# Patient Record
Sex: FEMALE | Race: WHITE | NOT HISPANIC OR LATINO | Employment: UNEMPLOYED | URBAN - METROPOLITAN AREA
[De-identification: names, ages, dates, MRNs, and addresses within clinical notes are randomized per-mention and may not be internally consistent; named-entity substitution may affect disease eponyms.]

---

## 2018-09-23 ENCOUNTER — HOSPITAL ENCOUNTER (EMERGENCY)
Facility: HOSPITAL | Age: 23
Discharge: HOME | End: 2018-09-23
Attending: FAMILY MEDICINE
Payer: MEDICAID

## 2018-09-23 VITALS
RESPIRATION RATE: 17 BRPM | OXYGEN SATURATION: 99 % | HEART RATE: 69 BPM | DIASTOLIC BLOOD PRESSURE: 71 MMHG | SYSTOLIC BLOOD PRESSURE: 125 MMHG | TEMPERATURE: 98 F | HEIGHT: 68 IN | WEIGHT: 293 LBS | BODY MASS INDEX: 44.41 KG/M2

## 2018-09-23 DIAGNOSIS — J06.9 URI, ACUTE: Primary | ICD-10-CM

## 2018-09-23 DIAGNOSIS — N30.00 ACUTE CYSTITIS WITHOUT HEMATURIA: ICD-10-CM

## 2018-09-23 LAB
B-HCG UR QL: NEGATIVE
BACTERIA #/AREA URNS AUTO: ABNORMAL /HPF
BACTERIA GENITAL QL WET PREP: ABNORMAL
BILIRUB UR QL STRIP: NEGATIVE
CLARITY UR REFRACT.AUTO: ABNORMAL
CLUE CELLS VAG QL WET PREP: ABNORMAL
COLOR UR AUTO: YELLOW
DEPRECATED S PYO AG THROAT QL EIA: NEGATIVE
FILAMENT FUNGI VAG WET PREP-#/AREA: ABNORMAL
FLUAV AG SPEC QL IA: NEGATIVE
FLUBV AG SPEC QL IA: NEGATIVE
GLUCOSE UR QL STRIP: NEGATIVE
HGB UR QL STRIP: NEGATIVE
KETONES UR QL STRIP: NEGATIVE
LEUKOCYTE ESTERASE UR QL STRIP: ABNORMAL
MICROSCOPIC COMMENT: ABNORMAL
NITRITE UR QL STRIP: NEGATIVE
PH UR STRIP: 5 [PH] (ref 5–8)
PROT UR QL STRIP: NEGATIVE
SP GR UR STRIP: 1.01 (ref 1–1.03)
SPECIMEN SOURCE: ABNORMAL
SPECIMEN SOURCE: NORMAL
SQUAMOUS #/AREA URNS AUTO: 3 /HPF
T VAGINALIS GENITAL QL WET PREP: ABNORMAL
URN SPEC COLLECT METH UR: ABNORMAL
UROBILINOGEN UR STRIP-ACNC: NEGATIVE EU/DL
WBC #/AREA URNS AUTO: 2 /HPF (ref 0–5)
WBC #/AREA VAG WET PREP: ABNORMAL
YEAST GENITAL QL WET PREP: ABNORMAL

## 2018-09-23 PROCEDURE — 87210 SMEAR WET MOUNT SALINE/INK: CPT

## 2018-09-23 PROCEDURE — 81000 URINALYSIS NONAUTO W/SCOPE: CPT

## 2018-09-23 PROCEDURE — 87400 INFLUENZA A/B EACH AG IA: CPT

## 2018-09-23 PROCEDURE — 87147 CULTURE TYPE IMMUNOLOGIC: CPT | Mod: 59

## 2018-09-23 PROCEDURE — 99284 EMERGENCY DEPT VISIT MOD MDM: CPT

## 2018-09-23 PROCEDURE — 87491 CHLMYD TRACH DNA AMP PROBE: CPT

## 2018-09-23 PROCEDURE — 87880 STREP A ASSAY W/OPTIC: CPT | Mod: 59

## 2018-09-23 PROCEDURE — 81025 URINE PREGNANCY TEST: CPT

## 2018-09-23 PROCEDURE — 87081 CULTURE SCREEN ONLY: CPT

## 2018-09-23 RX ORDER — NITROFURANTOIN 25; 75 MG/1; MG/1
100 CAPSULE ORAL 2 TIMES DAILY
Qty: 10 CAPSULE | Refills: 0 | Status: SHIPPED | OUTPATIENT
Start: 2018-09-23 | End: 2018-09-28

## 2018-09-23 RX ORDER — FLUCONAZOLE 200 MG/1
200 TABLET ORAL DAILY
Qty: 1 TABLET | Refills: 0 | Status: SHIPPED | OUTPATIENT
Start: 2018-09-23 | End: 2018-09-24

## 2018-09-23 NOTE — DISCHARGE INSTRUCTIONS
Your urinalysis revealed evidence of bladder infection.  You are instructed to follow up with your primary care provider for re-evaluation within 3 days.  You are instructed to return to the emergency department immediately for any new or worsening symptoms

## 2018-09-23 NOTE — ED PROVIDER NOTES
Encounter Date: 9/23/2018       History     Chief Complaint   Patient presents with    Generalized Body Aches     sore throat, body aches and bilateral ear pain x 3 days---also c/o possible yeast infection-c/o irritation to vagina with white discharge     23-year-old female presents to the emergency department for evaluation of 3 day history of bilateral ear pain, sore throat, mild body aches, dysuria and possible yeast infection.  She reports that the symptoms began gradually 3 days ago and have been constant since onset.  She denies any fever, headache, dizziness, chest pain, cough, abdominal pain, nausea, vomiting, diarrhea, flank pain or vaginal bleeding.  She reports that she had a baby 2 months ago and has not had a menstrual period since then.  He reports that it was an uncomplicated pregnancy, uncomplicated delivery and has not had any complications since coming home.  She reports that she is bottle-feeding not breast feeding.  She reports that she does have a history of HSV, but does not feel like she is having break.          Review of patient's allergies indicates:  No Known Allergies  No past medical history on file.  No past surgical history on file.  No family history on file.  Social History     Tobacco Use    Smoking status: Not on file   Substance Use Topics    Alcohol use: Not on file    Drug use: Not on file     Review of Systems   Constitutional: Negative for activity change, appetite change and fever.   HENT: Positive for congestion, rhinorrhea and sore throat. Negative for ear discharge, ear pain, facial swelling, nosebleeds, sinus pressure, sinus pain, trouble swallowing and voice change.    Eyes: Negative for photophobia and visual disturbance.   Respiratory: Negative for cough, chest tightness, shortness of breath and wheezing.    Cardiovascular: Negative for chest pain.   Gastrointestinal: Negative for abdominal pain, blood in stool, constipation, diarrhea, nausea and vomiting.    Genitourinary: Positive for dysuria and vaginal discharge. Negative for decreased urine volume, flank pain, hematuria, pelvic pain and vaginal bleeding.   Musculoskeletal: Negative for back pain, joint swelling, neck pain and neck stiffness.   Neurological: Negative for dizziness, syncope, weakness, light-headedness, numbness and headaches.   Psychiatric/Behavioral: Negative for confusion.       Physical Exam     Initial Vitals [09/23/18 1316]   BP Pulse Resp Temp SpO2   (!) 146/77 82 18 98.4 °F (36.9 °C) 100 %      MAP       --         Physical Exam    Nursing note and vitals reviewed.  Constitutional: She appears well-developed and well-nourished. She is not diaphoretic. No distress.   HENT:   Head: Normocephalic and atraumatic.   Right Ear: External ear normal.   Left Ear: External ear normal.   Nose: Nose normal.   Mouth/Throat: Oropharynx is clear and moist.   Eyes: Conjunctivae and EOM are normal. Pupils are equal, round, and reactive to light.   Neck: Normal range of motion. Neck supple.   Cardiovascular: Normal rate, regular rhythm and normal heart sounds. Exam reveals no gallop and no friction rub.    No murmur heard.  Pulmonary/Chest: Breath sounds normal. No respiratory distress. She has no wheezes. She has no rhonchi. She has no rales. She exhibits no tenderness.   Abdominal: Soft. Bowel sounds are normal. She exhibits no distension. There is no tenderness. There is no rebound and no CVA tenderness.   Genitourinary:   Genitourinary Comments: Mild thick white discharge noted in the vaginal vault.  No CMT or adnexal tenderness noted. No rash, ulcerations or chancre noted to the external genitalia.   Musculoskeletal: Normal range of motion.   Lymphadenopathy:     She has no cervical adenopathy.   Neurological: She is alert and oriented to person, place, and time. No cranial nerve deficit.   Skin: Skin is warm and dry.   Psychiatric: She has a normal mood and affect.         ED Course   Procedures  Labs  Reviewed   URINALYSIS, REFLEX TO URINE CULTURE - Abnormal; Notable for the following components:       Result Value    Appearance, UA Hazy (*)     Leukocytes, UA 1+ (*)     All other components within normal limits    Narrative:     Preferred Collection Type->Urine, Clean Catch   VAGINAL SCREEN - Abnormal; Notable for the following components:    WBC - Vaginal Screen Rare (*)     Bacteria - Vaginal Screen Moderate (*)     All other components within normal limits   URINALYSIS MICROSCOPIC - Abnormal; Notable for the following components:    Bacteria, UA Moderate (*)     All other components within normal limits    Narrative:     Preferred Collection Type->Urine, Clean Catch   THROAT SCREEN, RAPID   C. TRACHOMATIS/N. GONORRHOEAE BY AMP DNA   CULTURE, STREP A,  THROAT   INFLUENZA A AND B ANTIGEN   PREGNANCY TEST, URINE RAPID          Imaging Results    None          Medical Decision Making:   Initial Assessment:   23-year-old female presents for evaluation of nasal congestion, sore throat, generalized body aches, dysuria and vaginal discharge. Physical exam reveals a nontoxic-appearing female in no acute distress. Patient is afebrile vital signs within normal limits.  Neurological exam reveals an alert and oriented patient.  Patient appears well hydrated as her mucous membranes are moist. TMs reveal no erythema.  Posterior pharynx reveals erythema, edema or tonsillar exudate. No uvular edema or deviation noted.  No trismus, stridor or drooling noted.  Lungs clear to auscultation bilaterally. No respiratory distress or accessory muscle use noted. Abdominal exam reveals a soft abdomen, nontender to palpation. No CVA tenderness noted.  exam reveals Mild thick white discharge noted in the vaginal vault.  No CMT or adnexal tenderness noted. No rash, ulcerations or chancre noted to the external genitalia  Differential Diagnosis:   Streptococcal pharyngitis  Influenza  Viral URI  I carefully considered but doubt serious  intrathoracic etiology including pneumonia or consolidation.  No imaging indicated at this time.  UTI  Bacterial vaginosis  Candidal vaginosis  GC/chlamydia  I carefully considered but doubt serious intra-abdominal etiology including pyelonephritis, renal calculi, PID or TOA.  No imaging indicated at this time.  ED Management:  UPT negative. Influenza negative. Rapid strep negative. Vaginal screen reveals no evidence of clue cells or budding yeast.  However secondary to visualization of white thick discharge, will prescribe Diflucan for symptom control.  Urinalysis reveals evidence of possible early UTI.  I discussed these findings at length with the patient verbalizes understanding and agreement course of treatment.  Instructed the patient to follow up with her primary care provider for re-evaluation within 3 days.  Instructed the patient to return to the emergency department immediately for any new or worsening symptoms.                      Clinical Impression:   The primary encounter diagnosis was URI, acute. A diagnosis of Acute cystitis without hematuria was also pertinent to this visit.                             Brianna Ibrahim PA-C  09/23/18 1527

## 2018-09-23 NOTE — ED TRIAGE NOTES
Pt presents to ED c/o sore throat, body aches, and bilateral ear pain x 2 days. Taking OTC mucinex for symptoms; last dose last night. Pt also c/o thick white discharge and vaginital irritation. 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 Pt thinks she has a yeast infection. LMP 9/13; pt has a 3 month old son.

## 2018-09-24 LAB
C TRACH DNA SPEC QL NAA+PROBE: NOT DETECTED
N GONORRHOEA DNA SPEC QL NAA+PROBE: NOT DETECTED

## 2018-09-26 LAB
BACTERIA THROAT CULT: NORMAL
BACTERIA THROAT CULT: NORMAL

## 2018-09-28 NOTE — PROVIDER PROGRESS NOTES - EMERGENCY DEPT.
Encounter Date: 9/23/2018    ED Physician Progress Notes        Physician Note:   Attempted call today at 17:30 to assess for symptoms. Left a message for patient to return call to the emergency department for evaluation of symptoms given the strep culture result of group C strep.  Patient was not treated with antibiotics in the emergency department.